# Patient Record
Sex: FEMALE | Race: WHITE | NOT HISPANIC OR LATINO | Employment: FULL TIME | URBAN - METROPOLITAN AREA
[De-identification: names, ages, dates, MRNs, and addresses within clinical notes are randomized per-mention and may not be internally consistent; named-entity substitution may affect disease eponyms.]

---

## 2022-06-18 ENCOUNTER — HOSPITAL ENCOUNTER (EMERGENCY)
Facility: HOSPITAL | Age: 62
Discharge: HOME | End: 2022-06-18
Attending: EMERGENCY MEDICINE
Payer: COMMERCIAL

## 2022-06-18 VITALS
OXYGEN SATURATION: 96 % | WEIGHT: 254.6 LBS | BODY MASS INDEX: 38.58 KG/M2 | TEMPERATURE: 98.1 F | HEART RATE: 72 BPM | SYSTOLIC BLOOD PRESSURE: 162 MMHG | RESPIRATION RATE: 18 BRPM | HEIGHT: 68 IN | DIASTOLIC BLOOD PRESSURE: 85 MMHG

## 2022-06-18 DIAGNOSIS — M79.661 RIGHT CALF PAIN: Primary | ICD-10-CM

## 2022-06-18 PROCEDURE — 99283 EMERGENCY DEPT VISIT LOW MDM: CPT

## 2022-06-18 PROCEDURE — 99284 EMERGENCY DEPT VISIT MOD MDM: CPT | Performed by: EMERGENCY MEDICINE

## 2022-06-18 RX ORDER — IBUPROFEN 600 MG/1
600 TABLET ORAL ONCE
Status: COMPLETED | OUTPATIENT
Start: 2022-06-18 | End: 2022-06-18

## 2022-06-18 RX ORDER — ATORVASTATIN CALCIUM 10 MG/1
10 TABLET, FILM COATED ORAL DAILY
COMMUNITY

## 2022-06-18 RX ORDER — ESOMEPRAZOLE MAGNESIUM 40 MG/1
40 CAPSULE, DELAYED RELEASE ORAL
COMMUNITY

## 2022-06-18 RX ORDER — ASPIRIN 81 MG/1
81 TABLET, CHEWABLE ORAL DAILY
COMMUNITY

## 2022-06-18 RX ORDER — OMEPRAZOLE 20 MG/1
20 TABLET, DELAYED RELEASE ORAL DAILY
COMMUNITY

## 2022-06-18 RX ORDER — VALSARTAN 80 MG/1
80 TABLET ORAL DAILY
COMMUNITY

## 2022-06-18 RX ADMIN — IBUPROFEN 600 MG: 600 TABLET ORAL at 20:32

## 2022-06-19 ENCOUNTER — HOSPITAL ENCOUNTER (OUTPATIENT)
Dept: RADIOLOGY | Facility: HOSPITAL | Age: 62
Discharge: HOME | End: 2022-06-19
Payer: COMMERCIAL

## 2022-06-19 DIAGNOSIS — M79.661 RIGHT CALF PAIN: ICD-10-CM

## 2022-06-19 PROCEDURE — 93971 EXTREMITY STUDY: CPT

## 2022-06-19 NOTE — ED PROVIDER NOTES
History  Chief Complaint   Patient presents with    Knee Pain     States knee injury in January , seen by ortho had PT and brace  In May was kneeling for gardening and started with pain and swelling  Has appointment June 30 th with ortho  Now having shooting pains right knee to foot and ache right lower leg  States swelling medial aspect      HPI    This is a 59 yo very pleasant female who presents today with right knee pain  States she went to ortho regarding pain on lateral aspect of right calf which was due to pulled muscle and ortho gave her PT and brace which helped her  States has been doing gardening and kneeling made her knee more swollen and she does have hx of arthritis  No fevers  No trauma  States now has pain in right calf but feels better with palpation  States no hx of DVT  Impression: 59 yo F with right leg pain  Swelling superior to right knee joint  No tenderness of right calf  Will write for outpatietn DVT study  Reassurance provided  Prior to Admission Medications   Prescriptions Last Dose Informant Patient Reported?  Taking?   aspirin 81 mg chewable tablet 6/17/2022 at Unknown time Self Yes Yes   Sig: Chew 81 mg daily Patient takes every evening   atorvastatin (LIPITOR) 10 mg tablet 6/17/2022 at Unknown time Self Yes Yes   Sig: Take 10 mg by mouth daily Every evening   esomeprazole (NexIUM) 40 MG capsule Past Week at Unknown time Self Yes Yes   Sig: Take 40 mg by mouth every morning before breakfast Takes Nexium on Sunday and Wednesday and Omeprazole the rest of the days   omeprazole (PriLOSEC OTC) 20 MG tablet Past Week at Unknown time Self Yes Yes   Sig: Take 20 mg by mouth daily Takes daily except for Sunday and Wednesday when she takes Nexium   valsartan (DIOVAN) 80 mg tablet 6/18/2022 at 0630 Self Yes Yes   Sig: Take 80 mg by mouth daily      Facility-Administered Medications: None       Past Medical History:   Diagnosis Date    Hypertension        Past Surgical History:   Procedure Laterality Date    BACK SURGERY         History reviewed  No pertinent family history  I have reviewed and agree with the history as documented  E-Cigarette/Vaping    E-Cigarette Use Never User      E-Cigarette/Vaping Substances     Social History     Tobacco Use    Smoking status: Never Smoker    Smokeless tobacco: Never Used   Vaping Use    Vaping Use: Never used   Substance Use Topics    Alcohol use: Never    Drug use: Never       Review of Systems   Constitutional: Negative  Negative for diaphoresis and fever  HENT: Negative  Respiratory: Negative  Negative for cough, shortness of breath and wheezing  Cardiovascular: Negative  Negative for chest pain, palpitations and leg swelling  Gastrointestinal: Negative for abdominal distention, abdominal pain, nausea and vomiting  Genitourinary: Negative  Musculoskeletal:        Right leg pain     Skin: Negative  Neurological: Negative  Psychiatric/Behavioral: Negative  All other systems reviewed and are negative  Physical Exam  Physical Exam  Vitals and nursing note reviewed  Constitutional:       General: She is not in acute distress  Appearance: She is well-developed  HENT:      Head: Normocephalic and atraumatic  Eyes:      Conjunctiva/sclera: Conjunctivae normal    Cardiovascular:      Rate and Rhythm: Normal rate and regular rhythm  Heart sounds: No murmur heard  Pulmonary:      Effort: Pulmonary effort is normal  No respiratory distress  Breath sounds: Normal breath sounds  Abdominal:      Palpations: Abdomen is soft  Tenderness: There is no abdominal tenderness  Musculoskeletal:      Cervical back: Neck supple  Comments: Right knee superior swelling  Able to flex completely  Normal pulses  No tenderness in the calf  Soft compartments  Skin:     General: Skin is warm and dry  Neurological:      Mental Status: She is alert           Vital Signs  ED Triage Vitals [06/18/22 1950] Temperature Pulse Respirations Blood Pressure SpO2   98 1 °F (36 7 °C) 72 18 162/85 96 %      Temp Source Heart Rate Source Patient Position - Orthostatic VS BP Location FiO2 (%)   Tympanic Monitor Sitting Left arm --      Pain Score       8           Vitals:    06/18/22 1950   BP: 162/85   Pulse: 72   Patient Position - Orthostatic VS: Sitting         Visual Acuity      ED Medications  Medications   ibuprofen (MOTRIN) tablet 600 mg (600 mg Oral Given 6/18/22 2032)       Diagnostic Studies  Results Reviewed     None                 VAS lower limb venous duplex study, unilateral/limited    (Results Pending)              Procedures  Procedures         ED Course                                             MDM    Disposition  Final diagnoses:   Right calf pain     Time reflects when diagnosis was documented in both MDM as applicable and the Disposition within this note     Time User Action Codes Description Comment    6/18/2022  8:33 PM Will Holden Add [G38 376] Right calf pain       ED Disposition     ED Disposition   Discharge    Condition   Stable    Date/Time   Sat Jun 18, 2022  8:33 PM    Comment   Jose Gage discharge to home/self care                 Follow-up Information     Follow up With Specialties Details Why Contact Thor Tapia DO  Schedule an appointment as soon as possible for a visit   [id number]  [phone]            Discharge Medication List as of 6/18/2022  8:35 PM      CONTINUE these medications which have NOT CHANGED    Details   aspirin 81 mg chewable tablet Chew 81 mg daily Patient takes every evening, Historical Med      atorvastatin (LIPITOR) 10 mg tablet Take 10 mg by mouth daily Every evening, Historical Med      esomeprazole (NexIUM) 40 MG capsule Take 40 mg by mouth every morning before breakfast Takes Nexium on Sunday and Wednesday and Omeprazole the rest of the days, Historical Med      omeprazole (PriLOSEC OTC) 20 MG tablet Take 20 mg by mouth daily Takes daily except for Sunday and Wednesday when she takes Nexium, Historical Med      valsartan (DIOVAN) 80 mg tablet Take 80 mg by mouth daily, Historical Med             Outpatient Discharge Orders   VAS lower limb venous duplex study, unilateral/limited   Standing Status: Future Standing Exp   Date: 06/18/26       PDMP Review     None          ED Provider  Electronically Signed by           Kaelyn Molina MD  06/18/22 6117

## 2022-06-20 PROCEDURE — 93971 EXTREMITY STUDY: CPT | Performed by: SURGERY

## 2024-06-20 ENCOUNTER — HOSPITAL ENCOUNTER (EMERGENCY)
Facility: HOSPITAL | Age: 64
Discharge: HOME | End: 2024-06-20
Attending: EMERGENCY MEDICINE
Payer: MEDICARE

## 2024-06-20 VITALS
RESPIRATION RATE: 20 BRPM | TEMPERATURE: 97.7 F | DIASTOLIC BLOOD PRESSURE: 57 MMHG | HEART RATE: 72 BPM | SYSTOLIC BLOOD PRESSURE: 107 MMHG | OXYGEN SATURATION: 96 %

## 2024-06-20 DIAGNOSIS — T78.2XXA ANAPHYLAXIS, INITIAL ENCOUNTER: Primary | ICD-10-CM

## 2024-06-20 PROCEDURE — 99282 EMERGENCY DEPT VISIT SF MDM: CPT

## 2024-06-20 PROCEDURE — 99284 EMERGENCY DEPT VISIT MOD MDM: CPT | Performed by: EMERGENCY MEDICINE

## 2024-06-20 PROCEDURE — 96374 THER/PROPH/DIAG INJ IV PUSH: CPT

## 2024-06-20 PROCEDURE — 96361 HYDRATE IV INFUSION ADD-ON: CPT

## 2024-06-20 RX ORDER — FAMOTIDINE 10 MG/ML
20 INJECTION, SOLUTION INTRAVENOUS ONCE
Status: COMPLETED | OUTPATIENT
Start: 2024-06-20 | End: 2024-06-20

## 2024-06-20 RX ORDER — EPINEPHRINE 0.3 MG/.3ML
0.3 INJECTION SUBCUTANEOUS ONCE
Qty: 0.6 ML | Refills: 0 | Status: SHIPPED | OUTPATIENT
Start: 2024-06-20 | End: 2024-06-20

## 2024-06-20 RX ADMIN — SODIUM CHLORIDE 1000 ML: 0.9 INJECTION, SOLUTION INTRAVENOUS at 07:42

## 2024-06-20 RX ADMIN — FAMOTIDINE 20 MG: 10 INJECTION, SOLUTION INTRAVENOUS at 07:53

## 2024-06-20 NOTE — DISCHARGE INSTRUCTIONS
Use the prescribed EpiPen for any future anaphylactic reactions as evidenced by any lip or tongue swelling, diffuse hives, shortness of breath, wheezing.  If this happens, return to the emergency department immediately.

## 2024-06-20 NOTE — ED PROVIDER NOTES
History  Chief Complaint   Patient presents with    Allergic Reaction     Allergic reaction from insect bite, swelling in tongue and lips. Epi IM 0.3, Benadryl 50 mg, Solumedrol 125 mg en route.     HPI  Patient is a 64-year-old female history of hypertension presenting for evaluation of anaphylactic reaction to bee sting.  Patient states that she previously had some type of reaction about 40 years ago but states that she was stung by some type of flying insect a few years ago and did not have a reaction.  Shortly prior to come to the emergency department patient was stung by a bee on the right wrist.  Patient had immediate pain and swelling followed by lip and tongue swelling, sensation of throat swelling, hives, wheezing.  Patient transiently hypotensive for EMS.  Patient treated with epinephrine, Solu-Medrol, Benadryl by EMS in the field with rapid resolution of symptoms and is now minimally symptomatic.  Patient denies ongoing shortness of breath, throat swelling sensation, or pruritus, states that she feels relatively normal.  Prior to Admission Medications   Prescriptions Last Dose Informant Patient Reported? Taking?   aspirin 81 mg chewable tablet  Self Yes No   Sig: Chew 81 mg daily Patient takes every evening   atorvastatin (LIPITOR) 10 mg tablet  Self Yes No   Sig: Take 10 mg by mouth daily Every evening   esomeprazole (NexIUM) 40 MG capsule  Self Yes No   Sig: Take 40 mg by mouth every morning before breakfast Takes Nexium on Sunday and Wednesday and Omeprazole the rest of the days   omeprazole (PriLOSEC OTC) 20 MG tablet  Self Yes No   Sig: Take 20 mg by mouth daily Takes daily except for Sunday and Wednesday when she takes Nexium   valsartan (DIOVAN) 80 mg tablet  Self Yes No   Sig: Take 80 mg by mouth daily      Facility-Administered Medications: None       Past Medical History:   Diagnosis Date    Hypertension        Past Surgical History:   Procedure Laterality Date    BACK SURGERY         History  reviewed. No pertinent family history.  I have reviewed and agree with the history as documented.    E-Cigarette/Vaping    E-Cigarette Use Never User      E-Cigarette/Vaping Substances    Nicotine No     THC No     CBD No     Flavoring No     Other No     Unknown No      Social History     Tobacco Use    Smoking status: Never    Smokeless tobacco: Never   Vaping Use    Vaping status: Never Used   Substance Use Topics    Alcohol use: Never    Drug use: Never       Review of Systems   Constitutional:  Negative for chills, fatigue and fever.   Respiratory:  Positive for shortness of breath (Resolved) and wheezing (Resolved). Negative for cough.    Gastrointestinal:  Negative for diarrhea, nausea and vomiting.   Musculoskeletal:  Negative for arthralgias and myalgias.   Skin:  Positive for rash (Hives).   Neurological:  Positive for light-headedness. Negative for headaches.   Psychiatric/Behavioral:  Negative for confusion.    All other systems reviewed and are negative.      Physical Exam  Physical Exam  Vitals and nursing note reviewed.   Constitutional:       General: She is not in acute distress.     Appearance: Normal appearance. She is not ill-appearing, toxic-appearing or diaphoretic.      Comments: Well-appearing, nontoxic, nondistressed   HENT:      Head: Normocephalic and atraumatic.      Comments: Moist mucous membranes     Right Ear: External ear normal.      Left Ear: External ear normal.   Eyes:      General:         Right eye: No discharge.         Left eye: No discharge.   Cardiovascular:      Comments: Regular rate and rhythm, no murmurs rubs or gallops.  Extremities warm and well-perfused without mottling  Pulmonary:      Effort: No respiratory distress.      Comments: No increased work of breathing.  Speaking in complete sentences.  Lungs clear to auscultation bilaterally without wheezes, rales, rhonchi.  Satting 96% on room air indicating adequate oxygenation  Abdominal:      General: There is no  distension.      Comments: Abdomen soft, nontender, nondistended without rigidity, rebound, guarding   Musculoskeletal:         General: No deformity.      Cervical back: Normal range of motion.      Comments: Moderate erythema of the right wrist consistent with history of bee sting.  I do not visualize a stinger to remove.   Skin:     Findings: No lesion or rash.   Neurological:      Mental Status: She is alert and oriented to person, place, and time. Mental status is at baseline.      Comments: Awake, alert, pleasant, interactive   Psychiatric:         Mood and Affect: Mood and affect normal.         Vital Signs  ED Triage Vitals [06/20/24 0731]   Temperature Pulse Respirations Blood Pressure SpO2   97.7 °F (36.5 °C) 61 18 106/60 96 %      Temp Source Heart Rate Source Patient Position - Orthostatic VS BP Location FiO2 (%)   Oral Monitor Lying Right arm --      Pain Score       --           Vitals:    06/20/24 0852 06/20/24 0930 06/20/24 1000 06/20/24 1115   BP: 113/59 118/66 108/56 118/56   Pulse: 62 69 64 78   Patient Position - Orthostatic VS:             Visual Acuity      ED Medications  Medications   Famotidine (PF) (PEPCID) injection 20 mg (20 mg Intravenous Given 6/20/24 0753)   sodium chloride 0.9 % bolus 1,000 mL (0 mL Intravenous Stopped 6/20/24 0943)       Diagnostic Studies  Results Reviewed       None                   No orders to display              Procedures  Procedures         ED Course  ED Course as of 06/20/24 1125   Thu Jun 20, 2024   1122 Patient reassessed, asymptomatic, states that she feels good                               SBIRT 22yo+      Flowsheet Row Most Recent Value   Initial Alcohol Screen: US AUDIT-C     1. How often do you have a drink containing alcohol? 0 Filed at: 06/20/2024 0734   Audit-C Score 0 Filed at: 06/20/2024 0734                      Medical Decision Making  I obtained history from the patient and from EMS.  Patient with history consistent with severe anaphylactic  reaction and probable anaphylactic shock however currently minimally symptomatic, well-appearing with normal signs.  Patient does complain of some ongoing lightheadedness.  Plan to treat with Pepcid, IV fluids, observed for a total of 4 hours.  Patient well-appearing on reassessment.  Provided prescription for EpiPen, discharged with return precautions.    Risk  Prescription drug management.             Disposition  Final diagnoses:   Anaphylaxis, initial encounter     Time reflects when diagnosis was documented in both MDM as applicable and the Disposition within this note       Time User Action Codes Description Comment    6/20/2024 11:23 AM David Servin Add [T78.40XA] Allergic reaction, initial encounter     6/20/2024 11:24 AM David Servin Remove [T78.40XA] Allergic reaction, initial encounter     6/20/2024 11:24 AM David Servin Add [T78.2XXA] Anaphylaxis, initial encounter           ED Disposition       ED Disposition   Discharge    Condition   Stable    Date/Time   Thu Jun 20, 2024 1123    Comment   Tri Gage discharge to home/self care.                   Follow-up Information       Follow up With Specialties Details Why Contact Info Additional Information    [facility] Emergency Medicine  If symptoms worsen [address] [place] [id number]  [phone] [facility] Emergency Department, [address] [place] [address], [phone]            Patient's Medications   Discharge Prescriptions    EPINEPHRINE (EPIPEN) 0.3 MG/0.3 ML SOAJ    Inject 0.3 mL (0.3 mg total) into a muscle once for 1 dose       Start Date: 6/20/2024 End Date: 6/20/2024       Order Dose: 0.3 mg       Quantity: 0.6 mL    Refills: 0       No discharge procedures on file.    PDMP Review       None            ED Provider  Electronically Signed by             David Servin MD  06/20/24 3082

## 2024-06-20 NOTE — ED NOTES
Patient resting in bed, denies any new symptoms. Swelling in right arm and upper lip getting better.     Annette Melvin RN  06/20/24 1016

## 2024-07-31 ENCOUNTER — HOSPITAL ENCOUNTER (EMERGENCY)
Facility: HOSPITAL | Age: 64
Discharge: HOME | End: 2024-07-31
Attending: EMERGENCY MEDICINE | Admitting: EMERGENCY MEDICINE
Payer: MEDICARE

## 2024-07-31 VITALS
SYSTOLIC BLOOD PRESSURE: 126 MMHG | BODY MASS INDEX: 39.4 KG/M2 | DIASTOLIC BLOOD PRESSURE: 59 MMHG | TEMPERATURE: 96.9 F | OXYGEN SATURATION: 97 % | HEART RATE: 64 BPM | WEIGHT: 260 LBS | HEIGHT: 68 IN | RESPIRATION RATE: 17 BRPM

## 2024-07-31 DIAGNOSIS — T63.441A ALLERGIC REACTION TO BEE STING: Primary | ICD-10-CM

## 2024-07-31 PROCEDURE — 99284 EMERGENCY DEPT VISIT MOD MDM: CPT | Performed by: PHYSICIAN ASSISTANT

## 2024-07-31 PROCEDURE — 96361 HYDRATE IV INFUSION ADD-ON: CPT

## 2024-07-31 PROCEDURE — 99282 EMERGENCY DEPT VISIT SF MDM: CPT

## 2024-07-31 PROCEDURE — 96375 TX/PRO/DX INJ NEW DRUG ADDON: CPT

## 2024-07-31 PROCEDURE — 96374 THER/PROPH/DIAG INJ IV PUSH: CPT

## 2024-07-31 RX ORDER — METHYLPREDNISOLONE SODIUM SUCCINATE 125 MG/2ML
60 INJECTION, POWDER, LYOPHILIZED, FOR SOLUTION INTRAMUSCULAR; INTRAVENOUS ONCE
Status: COMPLETED | OUTPATIENT
Start: 2024-07-31 | End: 2024-07-31

## 2024-07-31 RX ORDER — PREDNISONE 20 MG/1
40 TABLET ORAL DAILY
Qty: 6 TABLET | Refills: 0 | Status: SHIPPED | OUTPATIENT
Start: 2024-07-31

## 2024-07-31 RX ORDER — FAMOTIDINE 10 MG/ML
20 INJECTION, SOLUTION INTRAVENOUS DAILY
Status: DISCONTINUED | OUTPATIENT
Start: 2024-07-31 | End: 2024-07-31 | Stop reason: HOSPADM

## 2024-07-31 RX ADMIN — SODIUM CHLORIDE 1000 ML: 0.9 INJECTION, SOLUTION INTRAVENOUS at 08:44

## 2024-07-31 RX ADMIN — METHYLPREDNISOLONE SODIUM SUCCINATE 60 MG: 125 INJECTION, POWDER, FOR SOLUTION INTRAMUSCULAR; INTRAVENOUS at 09:30

## 2024-07-31 RX ADMIN — FAMOTIDINE 20 MG: 10 INJECTION, SOLUTION INTRAVENOUS at 08:43

## 2024-07-31 NOTE — ED PROVIDER NOTES
History  Chief Complaint   Patient presents with    Insect Bite - Marked Reaction     Pt states around 0730 stung by 2 bees in separate sites. Hx allergic reaction to bee sting approximately 1 month ago. Right 3rd digit swelling by site, left nose sting, redness to cheeks and nose. Denies any shortness of breath. No visible swelling to tongue. Denies any difficulty swallowing. Pt took her own epi pen at home. Denies any numbness tingling.     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 Reports she was stung on R 3rd finger and nasal bridge after pulling weeds this morning. Self administered epinephrine. Given solumedrol and benadryl en route. Reports no sob or wheezing. Reports no difficulty swallowing. Reports no rash or hives. History of prior reaction to bee sting in the past.         Prior to Admission Medications   Prescriptions Last Dose Informant Patient Reported? Taking?   EPINEPHrine (EPIPEN) 0.3 mg/0.3 mL SOAJ   No Yes   Sig: Inject 0.3 mL (0.3 mg total) into a muscle once for 1 dose   aspirin 81 mg chewable tablet  Self Yes No   Sig: Chew 81 mg daily Patient takes every evening   atorvastatin (LIPITOR) 10 mg tablet 7/30/2024 Self Yes Yes   Sig: Take 10 mg by mouth daily Every evening   esomeprazole (NexIUM) 40 MG capsule Past Week Self Yes Yes   Sig: Take 40 mg by mouth every morning before breakfast Takes Nexium on Sunday and Wednesday and Omeprazole the rest of the days   omeprazole (PriLOSEC OTC) 20 MG tablet 7/30/2024 Self Yes Yes   Sig: Take 20 mg by mouth daily Takes daily except for Sunday and Wednesday when she takes Nexium   valsartan (DIOVAN) 80 mg tablet 7/30/2024 Self Yes Yes   Sig: Take 80 mg by mouth daily      Facility-Administered Medications: None       Past Medical History:   Diagnosis Date    Hypertension        Past Surgical History:   Procedure Laterality Date    BACK SURGERY         History reviewed. No pertinent family history.  I have reviewed and agree with the  history as documented.    E-Cigarette/Vaping    E-Cigarette Use Never User      E-Cigarette/Vaping Substances    Nicotine No     THC No     CBD No     Flavoring No     Other No     Unknown No      Social History     Tobacco Use    Smoking status: Never    Smokeless tobacco: Never   Vaping Use    Vaping status: Never Used   Substance Use Topics    Alcohol use: Never    Drug use: Never       Review of Systems   Constitutional:  Negative for fever.   Eyes:  Negative for itching.   Respiratory:  Negative for shortness of breath, wheezing and stridor.    Cardiovascular:  Negative for chest pain.   Gastrointestinal:  Negative for abdominal pain, nausea and vomiting.   Skin:  Negative for rash.       Physical Exam  Physical Exam  Vitals and nursing note reviewed.   Constitutional:       General: She is not in acute distress.     Appearance: Normal appearance. She is not ill-appearing, toxic-appearing or diaphoretic.   HENT:      Head: Normocephalic and atraumatic.      Right Ear: Tympanic membrane, ear canal and external ear normal.      Left Ear: Tympanic membrane, ear canal and external ear normal.      Nose:      Comments: Blood blister nasal bridge. No stinger present     Mouth/Throat:      Mouth: Mucous membranes are moist.      Pharynx: Oropharynx is clear.   Eyes:      Extraocular Movements: Extraocular movements intact.      Conjunctiva/sclera: Conjunctivae normal.      Pupils: Pupils are equal, round, and reactive to light.   Cardiovascular:      Rate and Rhythm: Normal rate and regular rhythm.      Pulses: Normal pulses.      Heart sounds: Normal heart sounds.   Pulmonary:      Effort: Pulmonary effort is normal.      Breath sounds: Normal breath sounds.   Abdominal:      General: Abdomen is flat. Bowel sounds are normal.      Palpations: Abdomen is soft.   Musculoskeletal:         General: Normal range of motion.      Cervical back: Normal range of motion and neck supple.      Comments: Mild swelling R 3rd  finger. No stinger present    Skin:     General: Skin is warm and dry.      Capillary Refill: Capillary refill takes less than 2 seconds.   Neurological:      General: No focal deficit present.      Mental Status: She is oriented to person, place, and time. Mental status is at baseline.         Vital Signs  ED Triage Vitals   Temperature Pulse Respirations Blood Pressure SpO2   07/31/24 0824 07/31/24 0824 07/31/24 0824 07/31/24 0824 07/31/24 0800   97.8 °F (36.6 °C) 87 20 148/74 98 %      Temp Source Heart Rate Source Patient Position - Orthostatic VS BP Location FiO2 (%)   07/31/24 0824 07/31/24 0824 07/31/24 0824 07/31/24 0824 --   Oral Monitor Lying Right arm       Pain Score       07/31/24 0900       No Pain           Vitals:    07/31/24 0824 07/31/24 0830 07/31/24 0900 07/31/24 0930   BP: 148/74 (!) 171/81 133/71 137/66   Pulse: 87 81 70 63   Patient Position - Orthostatic VS: Lying  Lying          Visual Acuity      ED Medications  Medications   Famotidine (PF) (PEPCID) injection 20 mg (20 mg Intravenous Given 7/31/24 0843)   sodium chloride 0.9 % bolus 1,000 mL (1,000 mL Intravenous New Bag 7/31/24 0844)   methylPREDNISolone sodium succinate (Solu-MEDROL) injection 60 mg (60 mg Intravenous Given 7/31/24 0930)       Diagnostic Studies  Results Reviewed       None                   No orders to display              Procedures  Procedures         ED Course                                 SBIRT 22yo+      Flowsheet Row Most Recent Value   Initial Alcohol Screen: US AUDIT-C     1. How often do you have a drink containing alcohol? 0 Filed at: 07/31/2024 0831   2. How many drinks containing alcohol do you have on a typical day you are drinking?  0 Filed at: 07/31/2024 0831   3b. FEMALE Any Age, or MALE 65+: How often do you have 4 or more drinks on one occassion? 0 Filed at: 07/31/2024 0831   Audit-C Score 0 Filed at: 07/31/2024 0831   JIMMIE: How many times in the past year have you...    Used an illegal drug or used  a prescription medication for non-medical reasons? Never Filed at: 07/31/2024 0831                      Medical Decision Making  Patient is well appearing and non toxic. No progression of symptoms during ED course. Pulse ox 95% ra indicating adequate oxygenation at time of discharge. States she has epi pen at home. Will prescribe 3 day prednisone course. Advised may taken benadryl / pepcid over the counter. Follow up with PCP for persistent concerns. Return precautions reviewed with patient     Risk  Prescription drug management.                 Disposition  Final diagnoses:   Allergic reaction to bee sting     Time reflects when diagnosis was documented in both MDM as applicable and the Disposition within this note       Time User Action Codes Description Comment    7/31/2024 10:33 AM Paco Montez Add [T63.441A] Allergic reaction to bee sting           ED Disposition       ED Disposition   Discharge    Condition   Stable    Date/Time   Wed Jul 31, 2024 1033    Comment   Tri Gage discharge to home/self care.                   Follow-up Information       Follow up With Specialties Details Why Contact Info    Lco Vicente, DO    [address] [id number] [phone]              Patient's Medications   Discharge Prescriptions    PREDNISONE 20 MG TABLET    Take 2 tablets (40 mg total) by mouth daily       Start Date: 7/31/2024 End Date: --       Order Dose: 40 mg       Quantity: 6 tablet    Refills: 0       No discharge procedures on file.    PDMP Review       None            ED Provider  Electronically Signed by             Paco Montez PA-C  07/31/24 1037

## 2024-07-31 NOTE — DISCHARGE INSTRUCTIONS
Intermittent cold packs to R 3rd finger and nasal bridge    May take benadryl 50 mg every 4-6 hours as needed for itching. Can cause drowsiness    Prednisone x 3 days    Use epi pen if shortness of breath, wheezing, difficulty swallowing    Follow up with your primary care provider for any persistent concerns    Return to ED for worsening symptoms

## 2024-11-14 ENCOUNTER — APPOINTMENT (OUTPATIENT)
Dept: LAB | Facility: HOSPITAL | Age: 64
End: 2024-11-14
Payer: MEDICARE

## 2024-11-14 DIAGNOSIS — T63.484D: ICD-10-CM

## 2024-11-14 DIAGNOSIS — T78.2XXD: ICD-10-CM

## 2024-11-14 PROCEDURE — 36415 COLL VENOUS BLD VENIPUNCTURE: CPT

## 2024-11-14 PROCEDURE — 83520 IMMUNOASSAY QUANT NOS NONAB: CPT

## 2024-11-14 PROCEDURE — 86003 ALLG SPEC IGE CRUDE XTRC EA: CPT

## 2024-11-17 LAB — TRYPTASE SERPL-MCNC: 3.8 UG/L (ref 2.2–13.2)

## 2024-11-21 LAB
Lab: NORMAL
MISCELLANEOUS LAB TEST RESULT: NORMAL
PAPER WASP IGE QN: 0.49 KU/L
STONE ANALYSIS-IMP: NORMAL
WHITEFACED HORNET IGE QN: 0.61 KU/L
YELLOW HORNET IGE QN: 0.14 KU/L
YELLOW JACKET IGE QN: 8.19 KU/L

## 2024-11-30 ENCOUNTER — HOSPITAL ENCOUNTER (OUTPATIENT)
Dept: RADIOLOGY | Facility: HOSPITAL | Age: 64
Discharge: HOME | End: 2024-11-30
Payer: MEDICARE

## 2024-11-30 ENCOUNTER — APPOINTMENT (OUTPATIENT)
Dept: LAB | Facility: HOSPITAL | Age: 64
End: 2024-11-30
Payer: MEDICARE

## 2024-11-30 DIAGNOSIS — T63.484D: ICD-10-CM

## 2024-11-30 DIAGNOSIS — I10 ESSENTIAL HYPERTENSION, MALIGNANT: ICD-10-CM

## 2024-11-30 DIAGNOSIS — E78.2 MIXED HYPERLIPIDEMIA: ICD-10-CM

## 2024-11-30 DIAGNOSIS — R63.5 ABNORMAL WEIGHT GAIN: ICD-10-CM

## 2024-11-30 DIAGNOSIS — E53.8 BIOTIN-(PROPIONYL-COA-CARBOXYLASE) LIGASE DEFICIENCY: ICD-10-CM

## 2024-11-30 DIAGNOSIS — T78.2XXD: ICD-10-CM

## 2024-11-30 DIAGNOSIS — E11.00 TYPE 2 DIABETES MELLITUS WITH HYPEROSMOLARITY WITHOUT COMA, WITHOUT LONG-TERM CURRENT USE OF INSULIN (HCC): ICD-10-CM

## 2024-11-30 LAB
25(OH)D3 SERPL-MCNC: 56.4 NG/ML (ref 30–100)
ALBUMIN SERPL BCG-MCNC: 3.9 G/DL (ref 3.5–5)
ALP SERPL-CCNC: 77 U/L (ref 34–104)
ALT SERPL W P-5'-P-CCNC: 14 U/L (ref 7–52)
ANION GAP SERPL CALCULATED.3IONS-SCNC: 5 MMOL/L (ref 4–13)
AST SERPL W P-5'-P-CCNC: 14 U/L (ref 13–39)
BASOPHILS # BLD AUTO: 0.05 THOUSANDS/ΜL (ref 0–0.1)
BASOPHILS NFR BLD AUTO: 1 % (ref 0–1)
BILIRUB SERPL-MCNC: 0.44 MG/DL (ref 0.2–1)
BUN SERPL-MCNC: 24 MG/DL (ref 5–25)
CALCIUM SERPL-MCNC: 9 MG/DL (ref 8.4–10.2)
CHLORIDE SERPL-SCNC: 104 MMOL/L (ref 96–108)
CHOLEST SERPL-MCNC: 146 MG/DL (ref ?–200)
CO2 SERPL-SCNC: 30 MMOL/L (ref 21–32)
CREAT SERPL-MCNC: 0.96 MG/DL (ref 0.6–1.3)
EOSINOPHIL # BLD AUTO: 0.23 THOUSAND/ΜL (ref 0–0.61)
EOSINOPHIL NFR BLD AUTO: 3 % (ref 0–6)
ERYTHROCYTE [DISTWIDTH] IN BLOOD BY AUTOMATED COUNT: 13.5 % (ref 11.6–15.1)
GFR SERPL CREATININE-BSD FRML MDRD: 62 ML/MIN/1.73SQ M
GLUCOSE P FAST SERPL-MCNC: 87 MG/DL (ref 65–99)
HCT VFR BLD AUTO: 41.7 % (ref 34.8–46.1)
HDLC SERPL-MCNC: 56 MG/DL
HGB BLD-MCNC: 13.4 G/DL (ref 11.5–15.4)
IMM GRANULOCYTES # BLD AUTO: 0.03 THOUSAND/UL (ref 0–0.2)
IMM GRANULOCYTES NFR BLD AUTO: 0 % (ref 0–2)
LDLC SERPL CALC-MCNC: 77 MG/DL (ref 0–100)
LYMPHOCYTES # BLD AUTO: 2.56 THOUSANDS/ΜL (ref 0.6–4.47)
LYMPHOCYTES NFR BLD AUTO: 29 % (ref 14–44)
MCH RBC QN AUTO: 30.2 PG (ref 26.8–34.3)
MCHC RBC AUTO-ENTMCNC: 32.1 G/DL (ref 31.4–37.4)
MCV RBC AUTO: 94 FL (ref 82–98)
MONOCYTES # BLD AUTO: 0.72 THOUSAND/ΜL (ref 0.17–1.22)
MONOCYTES NFR BLD AUTO: 8 % (ref 4–12)
NEUTROPHILS # BLD AUTO: 5.18 THOUSANDS/ΜL (ref 1.85–7.62)
NEUTS SEG NFR BLD AUTO: 59 % (ref 43–75)
NONHDLC SERPL-MCNC: 90 MG/DL
NRBC BLD AUTO-RTO: 0 /100 WBCS
PLATELET # BLD AUTO: 276 THOUSANDS/UL (ref 149–390)
PMV BLD AUTO: 10.4 FL (ref 8.9–12.7)
POTASSIUM SERPL-SCNC: 3.7 MMOL/L (ref 3.5–5.3)
PROT SERPL-MCNC: 6.7 G/DL (ref 6.4–8.4)
RBC # BLD AUTO: 4.44 MILLION/UL (ref 3.81–5.12)
SODIUM SERPL-SCNC: 139 MMOL/L (ref 135–147)
TRIGL SERPL-MCNC: 66 MG/DL (ref ?–150)
TSH SERPL DL<=0.05 MIU/L-ACNC: 1.35 UIU/ML (ref 0.45–4.5)
WBC # BLD AUTO: 8.77 THOUSAND/UL (ref 4.31–10.16)

## 2024-11-30 PROCEDURE — 82306 VITAMIN D 25 HYDROXY: CPT

## 2024-11-30 PROCEDURE — 80053 COMPREHEN METABOLIC PANEL: CPT

## 2024-11-30 PROCEDURE — 85025 COMPLETE CBC W/AUTO DIFF WBC: CPT

## 2024-11-30 PROCEDURE — 84443 ASSAY THYROID STIM HORMONE: CPT

## 2024-11-30 PROCEDURE — 80061 LIPID PANEL: CPT

## 2024-11-30 PROCEDURE — 36415 COLL VENOUS BLD VENIPUNCTURE: CPT

## 2024-11-30 PROCEDURE — 71046 X-RAY EXAM CHEST 2 VIEWS: CPT

## 2024-12-05 ENCOUNTER — HOSPITAL ENCOUNTER (OUTPATIENT)
Dept: RADIOLOGY | Facility: HOSPITAL | Age: 64
Discharge: HOME | End: 2024-12-05
Payer: MEDICARE

## 2024-12-05 DIAGNOSIS — R09.82 POSTNASAL DRIP: ICD-10-CM

## 2024-12-05 DIAGNOSIS — J32.9 CHRONIC SINUSITIS: ICD-10-CM

## 2024-12-05 DIAGNOSIS — R91.1 SOLITARY PULMONARY NODULE: ICD-10-CM

## 2024-12-05 PROCEDURE — 71250 CT THORAX DX C-: CPT

## 2024-12-05 PROCEDURE — 70486 CT MAXILLOFACIAL W/O DYE: CPT

## 2024-12-09 ENCOUNTER — HOSPITAL ENCOUNTER (OUTPATIENT)
Dept: PULMONOLOGY | Facility: HOSPITAL | Age: 64
Discharge: HOME | End: 2024-12-09

## 2024-12-09 DIAGNOSIS — R93.89 ABNORMAL CHEST X-RAY: ICD-10-CM

## 2024-12-09 DIAGNOSIS — J45.909 UNSPECIFIED ASTHMA, UNCOMPLICATED: ICD-10-CM

## 2025-01-16 ENCOUNTER — HOSPITAL ENCOUNTER (OUTPATIENT)
Dept: PULMONOLOGY | Facility: HOSPITAL | Age: 65
End: 2025-01-16
Payer: MEDICARE

## 2025-01-16 PROBLEM — R93.89 ABNORMAL CHEST X-RAY: Status: ACTIVE | Noted: 2025-01-16

## 2025-01-16 PROCEDURE — 94726 PLETHYSMOGRAPHY LUNG VOLUMES: CPT | Performed by: INTERNAL MEDICINE

## 2025-01-16 PROCEDURE — 94729 DIFFUSING CAPACITY: CPT | Performed by: INTERNAL MEDICINE

## 2025-01-16 PROCEDURE — 94729 DIFFUSING CAPACITY: CPT

## 2025-01-16 PROCEDURE — 94060 EVALUATION OF WHEEZING: CPT

## 2025-01-16 PROCEDURE — 94060 EVALUATION OF WHEEZING: CPT | Performed by: INTERNAL MEDICINE

## 2025-01-16 PROCEDURE — 94726 PLETHYSMOGRAPHY LUNG VOLUMES: CPT

## 2025-01-16 PROCEDURE — 94760 N-INVAS EAR/PLS OXIMETRY 1: CPT

## 2025-01-16 RX ORDER — ALBUTEROL SULFATE 0.83 MG/ML
2.5 SOLUTION RESPIRATORY (INHALATION) ONCE
Status: COMPLETED | OUTPATIENT
Start: 2025-01-16 | End: 2025-01-16

## 2025-01-16 RX ADMIN — ALBUTEROL SULFATE 2.5 MG: 2.5 SOLUTION RESPIRATORY (INHALATION) at 16:49
